# Patient Record
Sex: MALE | Race: WHITE | URBAN - METROPOLITAN AREA
[De-identification: names, ages, dates, MRNs, and addresses within clinical notes are randomized per-mention and may not be internally consistent; named-entity substitution may affect disease eponyms.]

---

## 2024-06-03 ENCOUNTER — TELEPHONE (OUTPATIENT)
Dept: OTOLARYNGOLOGY | Facility: CLINIC | Age: 59
End: 2024-06-03

## 2024-06-03 NOTE — TELEPHONE ENCOUNTER
Patient would like confirmation his medical records have been received before his appointment, please advise.

## 2024-06-21 ENCOUNTER — OFFICE VISIT (OUTPATIENT)
Dept: OTOLARYNGOLOGY | Facility: CLINIC | Age: 59
End: 2024-06-21

## 2024-06-21 VITALS — WEIGHT: 272 LBS | HEIGHT: 72 IN | BODY MASS INDEX: 36.84 KG/M2

## 2024-06-21 DIAGNOSIS — G47.33 SEVERE OBSTRUCTIVE SLEEP APNEA: Primary | ICD-10-CM

## 2024-06-21 PROCEDURE — 3008F BODY MASS INDEX DOCD: CPT | Performed by: SPECIALIST

## 2024-06-21 PROCEDURE — 99213 OFFICE O/P EST LOW 20 MIN: CPT | Performed by: SPECIALIST

## 2024-06-21 RX ORDER — EPINEPHRINE 0.3 MG/.3ML
0.3 INJECTION SUBCUTANEOUS
COMMUNITY
Start: 2023-09-05

## 2024-06-21 RX ORDER — SILDENAFIL 100 MG/1
100 TABLET, FILM COATED ORAL
COMMUNITY
Start: 2024-03-22

## 2024-06-21 RX ORDER — OMEPRAZOLE 40 MG/1
40 CAPSULE, DELAYED RELEASE ORAL DAILY
COMMUNITY
Start: 2024-05-15 | End: 2024-08-13

## 2024-06-22 NOTE — PROGRESS NOTES
Cipriano Cohn is a 59 year old male.   Chief Complaint   Patient presents with    Sleep Apnea     CPAP     HPI:   Patient is here to be reevaluated for his sleep apnea.  He had a CPAP machine which she has been using regularly, however it is not functioning.    Current Outpatient Medications   Medication Sig Dispense Refill    Omeprazole 40 MG Oral Capsule Delayed Release Take 1 capsule (40 mg total) by mouth daily.      EPINEPHrine 0.3 MG/0.3ML Injection Solution Auto-injector Inject 0.3 mL (1 each total) into the muscle.      metFORMIN 500 MG Oral Tab Take 1 tablet (500 mg total) by mouth daily with breakfast.      Sildenafil Citrate 100 MG Oral Tab Take 1 tablet (100 mg total) by mouth.        Past Medical History:    Bilateral hearing loss    Essential hypertension    ARIK on CPAP      Social History:  Social History     Socioeconomic History    Marital status:    Tobacco Use    Smoking status: Former     Current packs/day: 0.50     Average packs/day: 0.5 packs/day for 40.5 years (20.2 ttl pk-yrs)     Types: Cigarettes     Start date: 1984    Smokeless tobacco: Never   Vaping Use    Vaping status: Never Used   Substance and Sexual Activity    Alcohol use: Yes     Comment: occasionally    Drug use: Never     Social Determinants of Health     Financial Resource Strain: Low Risk  (5/24/2024)    Received from [facility]    Overall Financial Resource Strain (CARDIA)     Difficulty of Paying Living Expenses: Not hard at all   Food Insecurity: No Food Insecurity (5/24/2024)    Received from [facility]    Hunger Vital Sign     Worried About Running Out of Food in the Last Year: Never true     Ran Out of Food in the Last Year: Never true   Transportation Needs: No Transportation Needs (5/24/2024)    Received from [facility]    PRAPARE - Transportation     Lack of Transportation (Medical): No     Lack of Transportation (Non-Medical): No   Housing  Stability: Low Risk  (5/24/2024)    Received from [facility]    Housing Stability Vital Sign     Unable to Pay for Housing in the Last Year: No     Number of Places Lived in the Last Year: 1     In the last 12 months, was there a time when you did not have a steady place to sleep or slept in a shelter (including now)?: No        REVIEW OF SYSTEMS:   GENERAL HEALTH: feels well otherwise  GENERAL : denies fever, chills, sweats, weight loss, weight gain  SKIN: denies any unusual skin lesions or rashes  RESPIRATORY: denies shortness of breath with exertion  NEURO: denies headaches    EXAM:   Ht 6' (1.829 m)   Wt 272 lb (123.4 kg)   BMI 36.89 kg/m²   System Details   Skin Inspection - Normal.   Constitutional Overall appearance - Normal.   Head/Face Facial features - Normal. Eyebrows - Normal. Skull - Normal.   Eyes Conjunctiva - Right: Normal, Left: Normal. Pupil - Right: Normal, Left: Normal.    Ears Inspection - Right: Normal, Left: Normal.   Canal - Right: Normal, Left: Normal.   TM - Right: Normal, Left: Normal.   Nasal External nose - Normal.   Nasal septum - Normal.  Turbinates - Normal.   Oral/Oropharynx Lips - Normal, Tonsils -1+ tonsils and elongated palate tongue - Normal    Neck Exam Inspection - Normal. Palpation - Normal. Parotid gland - Normal. Thyroid gland - Normal.   Lymph Detail Submental. Submandibular. Anterior cervical. Posterior cervical. Supraclavicular all without enlargement   Psychiatric Orientation - Oriented to time, place, person & situation. Appropriate mood and affect.   Neurological Memory - Normal. Cranial nerves - Cranial nerves II through XII grossly intact.     ASSESSMENT AND PLAN:   1. Severe obstructive sleep apnea  Reviewed sleep study done on 8/29/2013 which showed an apnea-hypopnea index of 57.  CPAP titration showed the best level to be at 13.  You are using your CPAP regularly, however the machine is malfunctioning and needs to be replaced.  If necessary  a repeat CPAP titration can be ordered.  - CPAP RE-TITRATION STUDY  - DME - External      The patient indicates understanding of these issues and agrees to the plan.      Melany Hurst MD  6/21/2024  8:21 PM

## 2024-06-22 NOTE — PATIENT INSTRUCTIONS
A placement CPAP machine was ordered as yours is broken.  If necessary, a repeat CPAP titration can be performed.  We reviewed your sleep study which showed an apnea-hypopnea index of 57 done on 8/29/2013 and your CPAP titration showing a CPAP level of 13 done on 8/29/2013.

## 2024-07-29 ENCOUNTER — PATIENT MESSAGE (OUTPATIENT)
Dept: OTOLARYNGOLOGY | Facility: CLINIC | Age: 59
End: 2024-07-29

## 2024-07-29 DIAGNOSIS — G47.33 SEVERE OBSTRUCTIVE SLEEP APNEA: Primary | ICD-10-CM

## 2024-07-29 NOTE — TELEPHONE ENCOUNTER
From: Cipriano Cohn  To: Melany Hurst  Sent: 7/29/2024 8:37 AM CDT  Subject: Cpap machine    I may have misunderstood but I thought you authorized the new cpap machine.   Please let me know what I have to do.  [phone]

## 2024-07-30 NOTE — TELEPHONE ENCOUNTER
Faxed over order to [facility] for the new CPAP machine, settings were the same, patient's machine was broken.

## 2024-07-31 ENCOUNTER — TELEPHONE (OUTPATIENT)
Dept: OTOLARYNGOLOGY | Facility: CLINIC | Age: 59
End: 2024-07-31

## 2024-07-31 NOTE — TELEPHONE ENCOUNTER
Called E, spoke to Mateo, to follow up regarding CPAP order. Per Mateo, [facility] has not received the CPAP order. Re-faxed CPAP order to [facility].  My chart message sent to patient.

## 2024-07-31 NOTE — TELEPHONE ENCOUNTER
Received call from Nichole, [facility], her call back number is [phone]. Nichole, reports they have not received the orders for his CPAP.  Explained to Nichole, the orders were faxed to E yesterday, spoke to Mateo this morning no orders, orders re-faxed, followed up in the afternoon, spoke to Guero, informed order is not here yet, but it could take 1-3 days to get to the department.    Plan is to follow up with Nichole tomorrow to check status of order.

## 2024-07-31 NOTE — TELEPHONE ENCOUNTER
Tereso Varela patient keeps calling for cpap order and they do not have an order. Please send with current face to face notes    Fax: [phone]

## 2024-07-31 NOTE — TELEPHONE ENCOUNTER
Called [facility], spoke to Guero, to follow up if the faxed orders for the patient's replacement CPAP machine have been received. Per Guero, she said they do not have the ability to see faxes received and it can take 1-3 days for the fax to be sent to the correct department. Even though patient's CPAP is a replacement, it is treated as a new order and goes to there Intake Department.  Asked to be transferred to the Intake department, no one is available, message left to call our office.    Called and spoke to patient and informed him of above conversation with the patient. Informed patient we will continue to follow up on his CPAP order. Also, sent patient my chart message.

## 2024-08-01 ENCOUNTER — TELEPHONE (OUTPATIENT)
Dept: OTOLARYNGOLOGY | Facility: CLINIC | Age: 59
End: 2024-08-01

## 2024-08-01 DIAGNOSIS — G47.33 SEVERE OBSTRUCTIVE SLEEP APNEA: Primary | ICD-10-CM

## 2024-08-01 NOTE — TELEPHONE ENCOUNTER
I Called [facility] after receiving message from Wes at [facility].  I was told that they need all of the CPAP settings in order to process this request and a base one sleep study. Will discuss this tomorrow and see if a new CPAP titration needs to be done.  Last sleep study was in 2013.

## 2024-08-01 NOTE — TELEPHONE ENCOUNTER
De Leon from [facility] calling stating order was received for CPAP but there is missing documents and order is not correct  Missing pressure settings,humidifier,supplies.signature and need base one sleep study.        Fax number [phone]

## 2024-08-01 NOTE — TELEPHONE ENCOUNTER
Cpap order, face sheet, LOV, sleep study refaxed to [facility] with confirmation. Spoke with Noemi at [facility] and she confirmed that cpap order, etc were all received, does not need any additional paperwork faxed at this time, [facility] will contact patient to replace cpap machine.  Contacted patient, notified of same, has already received call from [facility] to replace machine initiated.

## 2024-08-01 NOTE — TELEPHONE ENCOUNTER
Ms Munoz from [facility] calling about getting a cpap order. Fax number to [facility] - fax # [phone] or email - [email or web address]

## 2024-08-06 ENCOUNTER — TELEPHONE (OUTPATIENT)
Dept: OTOLARYNGOLOGY | Facility: CLINIC | Age: 59
End: 2024-08-06

## 2024-08-06 NOTE — TELEPHONE ENCOUNTER
Wes from [facility] states they do not have the missing documents with patient's sleep apnea order. She states they need the baseline sleep study that is signed by Dr. Hurst. She states the one from 8/29/2013 is titration and is unsigned. She states it can be faxed to [phone]. See 8/1 telephone encounter. Please advise.

## 2024-08-06 NOTE — TELEPHONE ENCOUNTER
Contacted ARI, Noemi and states still going through faxes from Friday 8/2/24, and will call us back, given office number.

## 2024-08-06 NOTE — TELEPHONE ENCOUNTER
Sleep study scan(scanned in 7/03/2024, resulted 8/29/2013), cpap titration and cpap machine supply sheet signed by Dr. Hurst faxed to [phone]